# Patient Record
Sex: MALE | Race: WHITE | Employment: FULL TIME | URBAN - METROPOLITAN AREA
[De-identification: names, ages, dates, MRNs, and addresses within clinical notes are randomized per-mention and may not be internally consistent; named-entity substitution may affect disease eponyms.]

---

## 2022-07-14 ENCOUNTER — OFFICE VISIT (OUTPATIENT)
Dept: ORTHOPEDIC SURGERY | Age: 62
End: 2022-07-14
Payer: COMMERCIAL

## 2022-07-14 VITALS — BODY MASS INDEX: 25.18 KG/M2 | HEIGHT: 69 IN | WEIGHT: 170 LBS

## 2022-07-14 DIAGNOSIS — M25.561 RIGHT KNEE PAIN, UNSPECIFIED CHRONICITY: Primary | ICD-10-CM

## 2022-07-14 PROCEDURE — 99204 OFFICE O/P NEW MOD 45 MIN: CPT | Performed by: ORTHOPAEDIC SURGERY

## 2022-07-15 ENCOUNTER — TELEPHONE (OUTPATIENT)
Dept: ORTHOPEDIC SURGERY | Age: 62
End: 2022-07-15

## 2022-07-15 NOTE — TELEPHONE ENCOUNTER
General Question     Subject: PATIENT STATES HE IS UNABLE TO GET HIS MRI SCHEDULE. Leia Alberts IS NEEDING PATIENT'S MRI ORDER FAX TO [phone]. PLEASE ADVISE.    Patient and /or Facility Request: Nabor Felder  Contact Number: [phone]

## 2022-07-15 NOTE — TELEPHONE ENCOUNTER
Care Management Discharge Note    Discharge Date: 07/27/2021       Discharge Disposition:  Home     Discharge Services:  No service needs identified    Discharge DME:  none    Discharge Transportation:  Family     Education Provided on the Discharge Plan:  Yes  Persons Notified of Discharge Plans: Yes  Patient/Family in Agreement with the Plan:  Yes    Handoff Referral Completed: Yes    Additional Information:  Patient discharged home with no needs identified.    Torito Garcias RN      Torito Garcias RN, BSN  5B RN Care Coordinator  [phone] phone  [phone] pager    Weekend or Holiday Care Coordinator [phone] pager  Job Code [id number]   Refaxed order. Let patient know this has been faxed over. I asked him let us know if this continues to be an issue. His MRI did not require precert. All questions answered. Patient expressed understanding.

## 2022-07-18 NOTE — PROGRESS NOTES
7/14/2022     Reason for visit:  Right knee pain    History of Present Illness: The patient is a 70-year-old male who presents for evaluation of his right knee. Presents as a referral from Dr. Noah Smith. He reports 3 weeks of pain. This occurred after he was running. He felt immediate pain followed by swelling. Ever since then he has had persistent pain as well as catching and locking of the knee. The pain is mostly medial.    Medical History:  No past medical history on file. Past Surgical History:   Procedure Laterality Date    HAND SURGERY Left 1997    KNEE SURGERY Left 1983    KNEE SURGERY Left 1984    SHOULDER ARTHROSCOPY Left 1977    SHOULDER ARTHROSCOPY Right 2006      No family history on file. Social History     Socioeconomic History    Marital status:      Spouse name: Not on file    Number of children: Not on file    Years of education: Not on file    Highest education level: Not on file   Occupational History    Not on file   Tobacco Use    Smoking status: Never    Smokeless tobacco: Not on file   Substance and Sexual Activity    Alcohol use: Not on file    Drug use: Not on file    Sexual activity: Not on file   Other Topics Concern    Not on file   Social History Narrative    Not on file     Social Determinants of Health     Financial Resource Strain: Not on file   Food Insecurity: Not on file   Transportation Needs: Not on file   Physical Activity: Not on file   Stress: Not on file   Social Connections: Not on file   Intimate Partner Violence: Not on file   Housing Stability: Not on file      Current Outpatient Medications on File Prior to Visit   Medication Sig Dispense Refill    Omega 3-6-9 Fatty Acids (OMEGA 3-6-9 COMPLEX PO) Take 1 tablet by mouth daily. Misc Natural Products (GLUCOSAMINE CHONDROITIN ADV PO) Take 2 tablets by mouth daily. Multiple Vitamins-Minerals (MULTIVITAMIN PO) Take 1 tablet by mouth daily.        No current facility-administered medications on file prior to visit. No Known Allergies     Review of Systems:  Constitutional: Patient is adequately groomed with no evidence of malnutrition  Mental Status: The patient is oriented to time, place and person. The patient's mood and affect are appropriate. Lymphatic: The lymphatic examination bilaterally reveals all areas to be without enlargement or induration. Vascular: Examination reveals no swelling or calf tenderness. Peripheral pulses are palpable and 2+. Neurological: The patient has good coordination. There is no weakness or sensory deficit. Skin:  Head/Neck: inspection reveals no rashes, ulcerations or lesions. Trunk: inspection reveals no rashes, ulcerations or lesions. Objective:  Ht 5' 9\" (1.753 m)   Wt 170 lb (77.1 kg)   BMI 25.10 kg/m²      Physical Exam:  The patient is well-appearing and in no apparent distress  Examination of the right knee   There is a small effusion, no gross deformity or skin changes  Range of motion reveals 0 to 125  Neg lachman, negative posterior drawer, no pain or laxity with varus or valgus stress at 0 degrees and 30 degrees of flexion  + medial joint line tenderness  5 out of 5 strength throughout distal muscle groups  Sensation is intact to light touch throughout all distributions  There is no calf swelling or tenderness  Palpable DP pulse, brisk cap refill, 2+ symmetric reflexes     Imagin view x-rays of the right knee were obtained the office today on 2022 and reviewed. There is no fracture or dislocation. No other abnormality. Assessment:  Right knee pain following injury. Concern for possible medial meniscus tear    Plan:  I discussed the patient the differential diagnosis. Given acuity of his injury combined with his swelling and mechanical symptoms we will proceed with an MRI. He is in agreement. Following that study he will return to review the results in the office and discuss definitive treatment options.     Greater than 45 minutes were spent with this encounter. Time spent included evaluating the patient's chart prior to arrival.  Evaluating the patient in the office including history, physical examination, imaging reviewing, and counseling on next steps. Lastly, time was spent discussing orders with my staff as well as providing documentation in the chart. Kia Frost MD            Orthopaedic Surgery Sports Medicine and [address] and [address] [facility]            Team Physician [facility] ([place]      Disclaimer: This note was dictated with voice recognition software. Though review and correction are routine, we apologize for any errors.

## 2022-07-21 ENCOUNTER — OFFICE VISIT (OUTPATIENT)
Dept: ORTHOPEDIC SURGERY | Age: 62
End: 2022-07-21
Payer: COMMERCIAL

## 2022-07-21 ENCOUNTER — TELEPHONE (OUTPATIENT)
Dept: ORTHOPEDIC SURGERY | Age: 62
End: 2022-07-21

## 2022-07-21 VITALS — HEIGHT: 69 IN | WEIGHT: 170 LBS | BODY MASS INDEX: 25.18 KG/M2

## 2022-07-21 DIAGNOSIS — S83.241A TEAR OF MEDIAL MENISCUS OF RIGHT KNEE, CURRENT, UNSPECIFIED TEAR TYPE, INITIAL ENCOUNTER: Primary | ICD-10-CM

## 2022-07-21 PROCEDURE — 99215 OFFICE O/P EST HI 40 MIN: CPT | Performed by: ORTHOPAEDIC SURGERY

## 2022-07-21 NOTE — TELEPHONE ENCOUNTER
Spoke with Pt about his MRI, Pt agreed to ome in today at 2:15 at [facility].  Address given

## 2022-07-25 NOTE — PROGRESS NOTES
2022     Reason for visit:  Right knee pain    History of Present Illness: The patient is a 71-year-old male who presents for evaluation of his right knee. Presents as a referral from Dr. Laura Jonas. He reports 3 weeks of pain. This occurred after he was running. He felt immediate pain followed by swelling. Ever since then he has had persistent pain as well as catching and locking of the knee. The pain is mostly medial.    At the last visit we elected proceed with an MRI. He is here to review the results and discuss definitive treatment options. Objective:  Ht 5' 9\" (1.753 m)   Wt 170 lb (77.1 kg)   BMI 25.10 kg/m²      Physical Exam:  The patient is well-appearing and in no apparent distress  Examination of the right knee   There is a small effusion, no gross deformity or skin changes  Range of motion reveals 0 to 125  Neg lachman, negative posterior drawer, no pain or laxity with varus or valgus stress at 0 degrees and 30 degrees of flexion  + medial joint line tenderness  5 out of 5 strength throughout distal muscle groups  Sensation is intact to light touch throughout all distributions  There is no calf swelling or tenderness  Palpable DP pulse, brisk cap refill, 2+ symmetric reflexes     Imagin view x-rays of the right knee were obtained the office today on 2022 and reviewed. There is no fracture or dislocation. No other abnormality. MRI of the right knee was reviewed. There is a medial meniscus tear involving the posterior horn. Overall chondral surfaces are intact. Assessment:  Right knee pain following injury. MRI reveals medial meniscus tear    Plan:  I had a very long discussion with the patient. We spent time reviewing the imaging findings. At this point he does have an isolated medial meniscus tear as result of his injury. His knee otherwise is very healthy with intact chondral surfaces. Options at this point include operative as well as nonoperative management. From a nonoperative standpoint we could consider cortisone injection and physical therapy. From a surgical standpoint we could consider right knee arthroscopy with partial medial meniscectomy. We thoroughly discussed the pros and cons associated both options. I did tell the patient that partial meniscectomy may result in increased risks of degeneration of the medial compartment as well as possible pain following surgery as result of meniscal deficiency. However at the same time it does give a high likelihood of improving symptoms from the meniscus tear itself. He does not have extensive degeneration of the knee which is a good prognostic factor associated with arthroscopy. The risks of surgical intervention were defined as but not limited to infection, bleeding, damage to blood vessels or nerves, need for additional surgery. He does understand elects proceed with surgical intervention. Greater than 45 minutes were spent with this encounter. Time spent included evaluating the patient's chart prior to arrival.  Evaluating the patient in the office including history, physical examination, imaging reviewing, and counseling on next steps. Lastly, time was spent discussing orders with my staff as well as providing documentation in the chart. Marco Zaldivar MD            Orthopaedic Surgery Sports Medicine and [address] [place] [address] and 102 [facility]            Team Physician Cyn[facility] ([facility])      Disclaimer: This note was dictated with voice recognition software. Though review and correction are routine, we apologize for any errors.

## 2022-08-05 ENCOUNTER — TELEPHONE (OUTPATIENT)
Dept: ORTHOPEDIC SURGERY | Age: 62
End: 2022-08-05

## 2022-08-23 RX ORDER — TADALAFIL 2.5 MG/1
2.5 TABLET ORAL DAILY
COMMUNITY
Start: 2021-09-12

## 2022-08-23 NOTE — PROGRESS NOTES
Name_______________________________________Printed:____________________  Date and time of surgery____8/26/2022______0730______________Arrival Time:__0600______________   1. The instructions given regarding when and if a patient needs to stop oral intake prior to surgery varies. Follow the specific instructions you were given                  _x__Nothing to eat or to drink after Midnight the night before.                   ____Carbo loading or ERAS instructions will be given to select patients-if you have been given those instructions -please do the following                           The evening before your surgery after dinner before midnight drink 40 ounces of gatorade. If you are diabetic use sugar free. The morning of surgery drink 40 ounces of water. This needs to be finished 3 hours prior to your surgery start time. 2. Take the following pills with a small sip of water on the morning of surgery___________________________________________________                  Do not take blood pressure medications ending in pril or sartan the cassia prior to surgery or the morning of surgery_   3. Aspirin, Ibuprofen, Advil, Naproxen, Vitamin E and other Anti-inflammatory products and supplements should be stopped for 5 -7days before surgery or as directed by your physician. 4. Check with your Doctor regarding stopping Plavix, Coumadin,Eliquis, Lovenox,Effient,Pradaxa,Xarelto, Fragmin or other blood thinners and follow their instructions. 5. Do not smoke, and do not drink any alcoholic beverages 24 hours prior to surgery. This includes NA Beer. Refrain from the usage of any recreational drugs. 6. You may brush your teeth and gargle the morning of surgery. DO NOT SWALLOW WATER   7. You MUST make arrangements for a responsible adult to stay on site while you are here and take you home after your surgery. You will not be allowed to leave alone or drive yourself home.   It is strongly suggested someone stay with you the first 24 hrs. Your surgery will be cancelled if you do not have a ride home. 8. A parent/legal guardian must accompany a child scheduled for surgery and plan to stay at the hospital until the child is discharged. Please do not bring other children with you. 9. Please wear simple, loose fitting clothing to the hospital.  Charlie Myles not bring valuables (money, credit cards, checkbooks, etc.) Do not wear any makeup (including no eye makeup) or nail polish on your fingers or toes. 10. DO NOT wear any jewelry or piercings on day of surgery. All body piercing jewelry must be removed. 11. If you have ___dentures, they will be removed before going to the OR; we will provide you a container. If you wear ___contact lenses or ___glasses, they will be removed; please bring a case for them. 12. Please see your family doctor/pediatrician for a history & physical and/or concerning medications. Bring any test results/reports from your physician's office. PCP__________________Phone___________H&P Appt. Date________             13 If you  have a Living Will and Durable Power of  for Healthcare, please bring in a copy. 15. Notify your Surgeon if you develop any illness between now and surgery  time, cough, cold, fever, sore throat, nausea, vomiting, etc.  Please notify your surgeon if you experience dizziness, shortness of breath or blurred vision between now & the time of your surgery             15. DO NOT shave your operative site 96 hours prior to surgery. For face & neck surgery, men may use an electric razor 48 hours prior to surgery. 16. Shower the night before or morning of surgery using an antibacterial soap or as you have been instructed. 17. To provide excellent care visitors will be limited to one in the room at any given time. 18.  Please bring picture ID and insurance card.              19.  Visit our web site for additional information:  [email or web address]-eprep              20.During flu season no children under the age of 15 are permitted in the hospital for the safety of all patients. 21. If you take a long acting insulin in the evening only  take half of your usual  dose the night  before your procedure              22. If you use a c-pap please bring DOS if staying overnight,             23.For your convenience [facility] has a pharmacy on site to fill your prescriptions. 24. If you use oxygen and have a portable tank please bring it  with you the DOS             25. Bring a complete list of all your medications with name and dose include any supplements. 26. Other__________________________________________   *Please call pre admission testing if you any further questions   [address]    [facility] HEART AND LUNG [facility]  [phone]   [address] [place] [address]       VISITOR POLICY(subject to change)    Current policy is 2 visitors per patient. No children. A mask is required. Visiting hours are 8a-8p. Overnight visitors will be at the discretion of the nurse. All above information reviewed with patient in person or by phone. Patient verbalizes understanding. All questions and concerns addressed.                                                                                                  Patient/Rep___patient_________________                                                                                                                                    PRE OP INSTRUCTIONS

## 2022-08-24 ENCOUNTER — TELEPHONE (OUTPATIENT)
Dept: ORTHOPEDIC SURGERY | Age: 62
End: 2022-08-24

## 2022-08-24 DIAGNOSIS — G89.18 POST-OP PAIN: ICD-10-CM

## 2022-08-24 DIAGNOSIS — S83.241A TEAR OF MEDIAL MENISCUS OF RIGHT KNEE, CURRENT, UNSPECIFIED TEAR TYPE, INITIAL ENCOUNTER: Primary | ICD-10-CM

## 2022-08-25 ENCOUNTER — TELEPHONE (OUTPATIENT)
Dept: ORTHOPEDIC SURGERY | Age: 62
End: 2022-08-25

## 2022-08-25 RX ORDER — TRAMADOL HYDROCHLORIDE 50 MG/1
50 TABLET ORAL EVERY 4 HOURS PRN
Qty: 30 TABLET | Refills: 0 | Status: SHIPPED | OUTPATIENT
Start: 2022-08-25 | End: 2022-09-01

## 2022-08-25 RX ORDER — ASPIRIN 325 MG
325 TABLET, DELAYED RELEASE (ENTERIC COATED) ORAL DAILY
Qty: 14 TABLET | Refills: 0 | Status: SHIPPED | OUTPATIENT
Start: 2022-08-25 | End: 2022-09-08

## 2022-08-25 RX ORDER — ONDANSETRON 4 MG/1
4 TABLET, FILM COATED ORAL EVERY 8 HOURS PRN
Qty: 21 TABLET | Refills: 0 | Status: SHIPPED | OUTPATIENT
Start: 2022-08-25 | End: 2022-09-01

## 2022-08-25 NOTE — DISCHARGE INSTRUCTIONS
Orthopedic Surgery Discharge Instructions    Medications:   A pain medication has been prescribed for you. Please take this as instructed by the pharmacist.  An anti-nausea medication has been prescribed for you to use as needed for nausea. Either aspirin or a blood thinner medication may have been prescribed for you. Please take this as instructed. Activity:  Weightbearing as tolerated with crutches as needed. Incision/dressings: You may remove your dressing in 72 hours. Until then the dressing needs to remain clean and dry. Following removal dressing please apply dry dressing daily. You may shower in 72 hours and allow the water to run over the incision. However no submerging underwater or getting in pools. Follow-up: If you do not already have a postoperative follow-up appointment scheduled you should call to schedule an appointment within 10 to 14 days of surgery. Emergency or after hours questions:  Please call the main call center at [phone] for all questions or concerns during evening and weekend hours. The call center will direct your call to the on-call physician to answer your questions and concerns. During weekly office hours you may call my assistant, Mar, at [phone]. Akhil Hansen MD            Orthopaedic Surgery Sports Medicine and [address] [facility] and 102 [facility] Physician [facility])        [address]    Follow your surgeons instructions. Follow up with your surgeon as directed. Observe the operative area for signs of excessive bleeding. If needed apply pressure,elevate if able and contact your surgeon. Observe the operative site for any signs of infection- such as increased pain,redness,fever greater than 101 degrees,swelling, foul odor or drainage. Contact your surgeon if any of these symptoms are present. Keep operative site clean and dry. Do not remove dressing unless instructed to by surgeon. Apply ice as directed. If unable to urinate once you are at home,  notify your surgeon or go to the Emergency Room. Avoid pulling,pushing or tugging to suture line. If you become short of breath call your doctor or go to the ER. Take medications as directed. Pain medication should be taken with food. Do not drive or operate machinery while taking narcotics. For any problems or question call your surgeon. ANESTHESIA DISCHARGE INSTRUCTIONS    Wear your seatbelt home. You are under the influence of drugs-do not drink alcohol, drive, operate machinery, make any important decisions or sign any legal documents for 24 hours. A responsible adult needs to be with you for 24 hours. You may experience lightheadedness, dizziness, or sleepiness following surgery. Rest at home today- increase activity as tolerated. Progress slowly to a regular diet unless your physician has instructed you otherwise. Drink plenty of water. If persistent nausea and vomiting becomes a problem, call your physician. Coughing, sore throat and muscle aches are other side effects of anesthesia, and should improve with time. Do not drive or operate machinery while taking narcotics.

## 2022-08-26 ENCOUNTER — ANESTHESIA EVENT (OUTPATIENT)
Dept: OPERATING ROOM | Age: 62
End: 2022-08-26
Payer: COMMERCIAL

## 2022-08-26 ENCOUNTER — ANESTHESIA (OUTPATIENT)
Dept: OPERATING ROOM | Age: 62
End: 2022-08-26
Payer: COMMERCIAL

## 2022-08-26 ENCOUNTER — HOSPITAL ENCOUNTER (OUTPATIENT)
Age: 62
Setting detail: OUTPATIENT SURGERY
Discharge: HOME | End: 2022-08-26
Attending: ORTHOPAEDIC SURGERY | Admitting: ORTHOPAEDIC SURGERY
Payer: COMMERCIAL

## 2022-08-26 VITALS
DIASTOLIC BLOOD PRESSURE: 87 MMHG | WEIGHT: 171 LBS | SYSTOLIC BLOOD PRESSURE: 138 MMHG | TEMPERATURE: 97.4 F | BODY MASS INDEX: 25.33 KG/M2 | RESPIRATION RATE: 17 BRPM | HEIGHT: 69 IN | OXYGEN SATURATION: 98 % | HEART RATE: 53 BPM

## 2022-08-26 PROCEDURE — 7100000001 HC PACU RECOVERY - ADDTL 15 MIN: Performed by: ORTHOPAEDIC SURGERY

## 2022-08-26 PROCEDURE — 7100000000 HC PACU RECOVERY - FIRST 15 MIN: Performed by: ORTHOPAEDIC SURGERY

## 2022-08-26 PROCEDURE — 2580000003 HC RX 258: Performed by: ORTHOPAEDIC SURGERY

## 2022-08-26 PROCEDURE — 2709999900 HC NON-CHARGEABLE SUPPLY: Performed by: ORTHOPAEDIC SURGERY

## 2022-08-26 PROCEDURE — 7100000010 HC PHASE II RECOVERY - FIRST 15 MIN: Performed by: ORTHOPAEDIC SURGERY

## 2022-08-26 PROCEDURE — 6370000000 HC RX 637 (ALT 250 FOR IP): Performed by: ANESTHESIOLOGY

## 2022-08-26 PROCEDURE — 3700000000 HC ANESTHESIA ATTENDED CARE: Performed by: ORTHOPAEDIC SURGERY

## 2022-08-26 PROCEDURE — 2500000003 HC RX 250 WO HCPCS: Performed by: ORTHOPAEDIC SURGERY

## 2022-08-26 PROCEDURE — 3600000004 HC SURGERY LEVEL 4 BASE: Performed by: ORTHOPAEDIC SURGERY

## 2022-08-26 PROCEDURE — 3600000014 HC SURGERY LEVEL 4 ADDTL 15MIN: Performed by: ORTHOPAEDIC SURGERY

## 2022-08-26 PROCEDURE — 2500000003 HC RX 250 WO HCPCS: Performed by: NURSE ANESTHETIST, CERTIFIED REGISTERED

## 2022-08-26 PROCEDURE — 6360000002 HC RX W HCPCS: Performed by: NURSE ANESTHETIST, CERTIFIED REGISTERED

## 2022-08-26 PROCEDURE — 6360000002 HC RX W HCPCS: Performed by: ORTHOPAEDIC SURGERY

## 2022-08-26 PROCEDURE — 7100000011 HC PHASE II RECOVERY - ADDTL 15 MIN: Performed by: ORTHOPAEDIC SURGERY

## 2022-08-26 PROCEDURE — 3700000001 HC ADD 15 MINUTES (ANESTHESIA): Performed by: ORTHOPAEDIC SURGERY

## 2022-08-26 RX ORDER — OXYCODONE HYDROCHLORIDE 5 MG/1
5 TABLET ORAL
Status: COMPLETED | OUTPATIENT
Start: 2022-08-26 | End: 2022-08-26

## 2022-08-26 RX ORDER — BUPIVACAINE HYDROCHLORIDE 5 MG/ML
INJECTION, SOLUTION EPIDURAL; INTRACAUDAL
Status: COMPLETED | OUTPATIENT
Start: 2022-08-26 | End: 2022-08-26

## 2022-08-26 RX ORDER — GLYCOPYRROLATE 0.2 MG/ML
INJECTION INTRAMUSCULAR; INTRAVENOUS PRN
Status: DISCONTINUED | OUTPATIENT
Start: 2022-08-26 | End: 2022-08-26 | Stop reason: SDUPTHER

## 2022-08-26 RX ORDER — LIDOCAINE HYDROCHLORIDE 10 MG/ML
1 INJECTION, SOLUTION EPIDURAL; INFILTRATION; INTRACAUDAL; PERINEURAL
Status: DISCONTINUED | OUTPATIENT
Start: 2022-08-26 | End: 2022-08-26 | Stop reason: HOSPADM

## 2022-08-26 RX ORDER — PROPOFOL 10 MG/ML
INJECTION, EMULSION INTRAVENOUS PRN
Status: DISCONTINUED | OUTPATIENT
Start: 2022-08-26 | End: 2022-08-26 | Stop reason: SDUPTHER

## 2022-08-26 RX ORDER — LABETALOL HYDROCHLORIDE 5 MG/ML
5 INJECTION, SOLUTION INTRAVENOUS
Status: DISCONTINUED | OUTPATIENT
Start: 2022-08-26 | End: 2022-08-26 | Stop reason: HOSPADM

## 2022-08-26 RX ORDER — FENTANYL CITRATE 50 UG/ML
INJECTION, SOLUTION INTRAMUSCULAR; INTRAVENOUS PRN
Status: DISCONTINUED | OUTPATIENT
Start: 2022-08-26 | End: 2022-08-26 | Stop reason: SDUPTHER

## 2022-08-26 RX ORDER — DEXAMETHASONE SODIUM PHOSPHATE 4 MG/ML
INJECTION, SOLUTION INTRA-ARTICULAR; INTRALESIONAL; INTRAMUSCULAR; INTRAVENOUS; SOFT TISSUE PRN
Status: DISCONTINUED | OUTPATIENT
Start: 2022-08-26 | End: 2022-08-26 | Stop reason: SDUPTHER

## 2022-08-26 RX ORDER — ONDANSETRON 2 MG/ML
INJECTION INTRAMUSCULAR; INTRAVENOUS PRN
Status: DISCONTINUED | OUTPATIENT
Start: 2022-08-26 | End: 2022-08-26 | Stop reason: SDUPTHER

## 2022-08-26 RX ORDER — SODIUM CHLORIDE 9 MG/ML
INJECTION, SOLUTION INTRAVENOUS CONTINUOUS
Status: DISCONTINUED | OUTPATIENT
Start: 2022-08-26 | End: 2022-08-26 | Stop reason: HOSPADM

## 2022-08-26 RX ORDER — MIDAZOLAM HYDROCHLORIDE 1 MG/ML
INJECTION INTRAMUSCULAR; INTRAVENOUS PRN
Status: DISCONTINUED | OUTPATIENT
Start: 2022-08-26 | End: 2022-08-26 | Stop reason: SDUPTHER

## 2022-08-26 RX ORDER — HYDROMORPHONE HCL 110MG/55ML
PATIENT CONTROLLED ANALGESIA SYRINGE INTRAVENOUS PRN
Status: DISCONTINUED | OUTPATIENT
Start: 2022-08-26 | End: 2022-08-26 | Stop reason: SDUPTHER

## 2022-08-26 RX ORDER — HYDROMORPHONE HCL 110MG/55ML
0.25 PATIENT CONTROLLED ANALGESIA SYRINGE INTRAVENOUS EVERY 5 MIN PRN
Status: DISCONTINUED | OUTPATIENT
Start: 2022-08-26 | End: 2022-08-26 | Stop reason: HOSPADM

## 2022-08-26 RX ORDER — ONDANSETRON 2 MG/ML
4 INJECTION INTRAMUSCULAR; INTRAVENOUS
Status: DISCONTINUED | OUTPATIENT
Start: 2022-08-26 | End: 2022-08-26 | Stop reason: HOSPADM

## 2022-08-26 RX ORDER — SODIUM CHLORIDE, SODIUM LACTATE, POTASSIUM CHLORIDE, CALCIUM CHLORIDE 600; 310; 30; 20 MG/100ML; MG/100ML; MG/100ML; MG/100ML
INJECTION, SOLUTION INTRAVENOUS CONTINUOUS
Status: DISCONTINUED | OUTPATIENT
Start: 2022-08-26 | End: 2022-08-26 | Stop reason: HOSPADM

## 2022-08-26 RX ORDER — KETAMINE HCL IN NACL, ISO-OSM 100MG/10ML
SYRINGE (ML) INJECTION PRN
Status: DISCONTINUED | OUTPATIENT
Start: 2022-08-26 | End: 2022-08-26 | Stop reason: SDUPTHER

## 2022-08-26 RX ORDER — KETOROLAC TROMETHAMINE 30 MG/ML
INJECTION, SOLUTION INTRAMUSCULAR; INTRAVENOUS PRN
Status: DISCONTINUED | OUTPATIENT
Start: 2022-08-26 | End: 2022-08-26 | Stop reason: SDUPTHER

## 2022-08-26 RX ORDER — LIDOCAINE HYDROCHLORIDE 20 MG/ML
INJECTION, SOLUTION EPIDURAL; INFILTRATION; INTRACAUDAL; PERINEURAL PRN
Status: DISCONTINUED | OUTPATIENT
Start: 2022-08-26 | End: 2022-08-26 | Stop reason: SDUPTHER

## 2022-08-26 RX ORDER — HYDROMORPHONE HCL 110MG/55ML
0.5 PATIENT CONTROLLED ANALGESIA SYRINGE INTRAVENOUS EVERY 5 MIN PRN
Status: DISCONTINUED | OUTPATIENT
Start: 2022-08-26 | End: 2022-08-26 | Stop reason: HOSPADM

## 2022-08-26 RX ADMIN — SODIUM CHLORIDE, POTASSIUM CHLORIDE, SODIUM LACTATE AND CALCIUM CHLORIDE: 600; 310; 30; 20 INJECTION, SOLUTION INTRAVENOUS at 09:38

## 2022-08-26 RX ADMIN — GLYCOPYRROLATE 0.2 MG: 0.2 INJECTION INTRAMUSCULAR; INTRAVENOUS at 11:47

## 2022-08-26 RX ADMIN — SODIUM CHLORIDE, POTASSIUM CHLORIDE, SODIUM LACTATE AND CALCIUM CHLORIDE: 600; 310; 30; 20 INJECTION, SOLUTION INTRAVENOUS at 10:02

## 2022-08-26 RX ADMIN — PROPOFOL 180 MG: 10 INJECTION, EMULSION INTRAVENOUS at 11:49

## 2022-08-26 RX ADMIN — FENTANYL CITRATE 100 MCG: 50 INJECTION, SOLUTION INTRAMUSCULAR; INTRAVENOUS at 11:45

## 2022-08-26 RX ADMIN — HYDROMORPHONE HYDROCHLORIDE 0.4 MG: 2 INJECTION, SOLUTION INTRAMUSCULAR; INTRAVENOUS; SUBCUTANEOUS at 12:41

## 2022-08-26 RX ADMIN — OXYCODONE 5 MG: 5 TABLET ORAL at 13:36

## 2022-08-26 RX ADMIN — CEFAZOLIN 2000 MG: 2 INJECTION, POWDER, FOR SOLUTION INTRAMUSCULAR; INTRAVENOUS at 11:41

## 2022-08-26 RX ADMIN — Medication 10 MG: at 12:38

## 2022-08-26 RX ADMIN — ONDANSETRON 4 MG: 2 INJECTION INTRAMUSCULAR; INTRAVENOUS at 11:49

## 2022-08-26 RX ADMIN — HYDROMORPHONE HYDROCHLORIDE 0.4 MG: 2 INJECTION, SOLUTION INTRAMUSCULAR; INTRAVENOUS; SUBCUTANEOUS at 12:37

## 2022-08-26 RX ADMIN — LIDOCAINE HYDROCHLORIDE 80 MG: 20 INJECTION, SOLUTION EPIDURAL; INFILTRATION; INTRACAUDAL; PERINEURAL at 11:49

## 2022-08-26 RX ADMIN — KETOROLAC TROMETHAMINE 30 MG: 60 INJECTION, SOLUTION INTRAMUSCULAR at 12:38

## 2022-08-26 RX ADMIN — MIDAZOLAM 2 MG: 1 INJECTION INTRAMUSCULAR; INTRAVENOUS at 11:44

## 2022-08-26 RX ADMIN — HYDROMORPHONE HYDROCHLORIDE 0.2 MG: 2 INJECTION, SOLUTION INTRAMUSCULAR; INTRAVENOUS; SUBCUTANEOUS at 12:52

## 2022-08-26 RX ADMIN — Medication 20 MG: at 11:49

## 2022-08-26 RX ADMIN — Medication 20 MG: at 11:56

## 2022-08-26 RX ADMIN — DEXAMETHASONE SODIUM PHOSPHATE 10 MG: 4 INJECTION, SOLUTION INTRAMUSCULAR; INTRAVENOUS at 11:51

## 2022-08-26 ASSESSMENT — PAIN - FUNCTIONAL ASSESSMENT: PAIN_FUNCTIONAL_ASSESSMENT: 0-10

## 2022-08-26 ASSESSMENT — PAIN SCALES - GENERAL
PAINLEVEL_OUTOF10: 4
PAINLEVEL_OUTOF10: 4
PAINLEVEL_OUTOF10: 0

## 2022-08-26 ASSESSMENT — PAIN DESCRIPTION - ORIENTATION
ORIENTATION: RIGHT
ORIENTATION: RIGHT

## 2022-08-26 ASSESSMENT — PAIN DESCRIPTION - LOCATION
LOCATION: KNEE
LOCATION: KNEE

## 2022-08-26 ASSESSMENT — PAIN DESCRIPTION - DESCRIPTORS
DESCRIPTORS: DISCOMFORT
DESCRIPTORS: DISCOMFORT

## 2022-08-26 NOTE — ANESTHESIA POSTPROCEDURE EVALUATION
Department of Anesthesiology  Postprocedure Note    Patient: Melia Carrasco  MRN: [id number]  YOB: 1960  Date of evaluation: 8/26/2022      Procedure Summary     Date: 08/26/22 Room / Location: [address]    Anesthesia Start: 6550 Anesthesia Stop: 6908    Procedure: RIGHT KNEE ARTHROSCOPY, PARTIAL MEDIAL MENISECTOMY (Right: Knee) Diagnosis:       Tear of medial meniscus of right knee, unspecified tear type, unspecified whether old or current tear, initial encounter      (N79.062O RIGHT MEDIAL MENSICUS TEAR)    Surgeons: Lian Powell MD Responsible Provider: Henry Parry MD    Anesthesia Type: general ASA Status: 1          Anesthesia Type: No value filed.     Tadeo Phase I: Tadeo Score: 10    Tadeo Phase II: Tadeo Score: 10      Anesthesia Post Evaluation    Patient location during evaluation: PACU  Patient participation: complete - patient participated  Level of consciousness: awake  Airway patency: patent  Nausea & Vomiting: no vomiting  Complications: no  Cardiovascular status: hemodynamically stable  Respiratory status: acceptable  Hydration status: euvolemic  Multimodal analgesia pain management approach

## 2022-08-26 NOTE — PROGRESS NOTES
Pt arrived from OR to PACU bay 6. Reported received from [place] staff. Pt unresponsive s/p anesthesia-breathing spontaneously. Surgical incisions dressings in place to right knee. Pt on 6L simple mask. NSR, and VSS. Will continue to monitor.

## 2022-08-26 NOTE — PROGRESS NOTES
CLINICAL PHARMACY NOTE: MEDS TO BEDS    Total # of Prescriptions Filled: 3   The following medications were delivered to the patient:  Tramadol  Asa 325  Ondansetron 4    Additional Documentation:  Wife signed for scripts

## 2022-08-26 NOTE — ANESTHESIA PRE PROCEDURE
Department of Anesthesiology  Preprocedure Note       Name:  Mirlande Kay   Age:  64 y.o.  :  1960                                          MRN:  [id number]         Date:  2022      Surgeon: Merly Howe):  Akhil Hansen MD    Procedure: Procedure(s):  RIGHT KNEE ARTHROSCOPY, PARTIAL MEDIAL MENISECTOMY    Medications prior to admission:   Prior to Admission medications    Medication Sig Start Date End Date Taking? Authorizing Provider   Tadalafil 2.5 MG TABS Take 2.5 mg by mouth daily 21  Yes Historical Provider, MD   ondansetron (ZOFRAN) 4 MG tablet Take 1 tablet by mouth every 8 hours as needed for Nausea or Vomiting 22  Akhil Hansen MD   aspirin 325 MG EC tablet Take 1 tablet by mouth daily for 14 days 22  Akhil Hansen MD   traMADol (ULTRAM) 50 MG tablet Take 1 tablet by mouth every 4 hours as needed for Pain for up to 30 doses. Intended supply: 7 days. Take lowest dose possible to manage pain 22  Akhil Hansen MD   Omega 3-6-9 Fatty Acids (OMEGA 3-6-9 COMPLEX PO) Take 1 tablet by mouth daily. Historical Provider, MD   Misc Natural Products (GLUCOSAMINE CHONDROITIN ADV PO) Take 2 tablets by mouth daily. Historical Provider, MD   Multiple Vitamins-Minerals (MULTIVITAMIN PO) Take 1 tablet by mouth daily.     Historical Provider, MD       Current medications:    Current Facility-Administered Medications   Medication Dose Route Frequency Provider Last Rate Last Admin    ceFAZolin (ANCEF) 2,000 mg in dextrose 5 % 50 mL IVPB (mini-bag)  2,000 mg IntraVENous On Call to Lexx Hargrove MD        lactated ringers infusion   IntraVENous Continuous Akhil Hansen MD 50 mL/hr at 22 1002 New Bag at 22 1002    lidocaine PF 1 % injection 1 mL  1 mL IntraDERmal Once PRN Deb Hinton MD        0.9 % sodium chloride infusion   IntraVENous Continuous Deb Hinton MD           Allergies:  No Known Allergies    Problem List:  There is no problem list on file for this patient. Past Medical History:  History reviewed. No pertinent past medical history. Past Surgical History:        Procedure Laterality Date    HAND SURGERY Left 1997    KNEE SURGERY Left 1983    KNEE SURGERY Left 1984    SHOULDER ARTHROSCOPY Left 1977    SHOULDER ARTHROSCOPY Right 2006       Social History:    Social History     Tobacco Use    Smoking status: Never    Smokeless tobacco: Never   Substance Use Topics    Alcohol use: Not on file                                Counseling given: Not Answered      Vital Signs (Current):   Vitals:    08/23/22 0940 08/26/22 0954   BP:  119/79   Pulse:  58   Resp:  16   Temp:  97.1 °F (36.2 °C)   TempSrc:  Temporal   SpO2:  97%   Weight: 172 lb (78 kg) 171 lb (77.6 kg)   Height: 5' 8.5\" (1.74 m) 5' 8.5\" (1.74 m)                                              BP Readings from Last 3 Encounters:   08/26/22 119/79   03/26/14 121/76   02/25/14 115/75       NPO Status: Time of last liquid consumption: 2130                        Time of last solid consumption: 2100                        Date of last liquid consumption: 08/25/22                        Date of last solid food consumption: 08/25/22    BMI:   Wt Readings from Last 3 Encounters:   08/26/22 171 lb (77.6 kg)   07/21/22 170 lb (77.1 kg)   07/14/22 170 lb (77.1 kg)     Body mass index is 25.62 kg/m². CBC: No results found for: WBC, RBC, HGB, HCT, MCV, RDW, PLT    CMP: No results found for: NA, K, CL, CO2, BUN, CREATININE, GFRAA, AGRATIO, LABGLOM, GLUCOSE, GLU, PROT, CALCIUM, BILITOT, ALKPHOS, AST, ALT    POC Tests: No results for input(s): POCGLU, POCNA, POCK, POCCL, POCBUN, POCHEMO, POCHCT in the last 72 hours. Coags: No results found for: PROTIME, INR, APTT    HCG (If Applicable): No results found for: PREGTESTUR, PREGSERUM, HCG, HCGQUANT     ABGs: No results found for: PHART, PO2ART, PYA6EXZ, LDI3NYQ, BEART, V4NWHMYJ     Type & Screen (If Applicable):   No results found for: Peter Miller    Drug/Infectious Status (If Applicable):  No results found for: HIV, HEPCAB    COVID-19 Screening (If Applicable): No results found for: COVID19        Anesthesia Evaluation  Patient summary reviewed and Nursing notes reviewed no history of anesthetic complications:   Airway: Mallampati: II  TM distance: >3 FB     Mouth opening: > = 3 FB   Dental: normal exam         Pulmonary:Negative Pulmonary ROS breath sounds clear to auscultation                             Cardiovascular:Negative CV ROS  Exercise tolerance: good (>4 METS),       (-) CABG/stent, dysrhythmias and  angina      Rhythm: regular  Rate: normal                    Neuro/Psych:   Negative Neuro/Psych ROS     (-) seizures, TIA and CVA           GI/Hepatic/Renal:        (-) GERD      ROS comment: Denies gerd sx or n/v today. Endo/Other:        (-) hypothyroidism, hyperthyroidism               Abdominal:             Vascular: negative vascular ROS. - DVT and PE. Other Findings:           Anesthesia Plan      general     ASA 1       Induction: intravenous. MIPS: Postoperative opioids intended and Prophylactic antiemetics administered. Anesthetic plan and risks discussed with patient. Plan discussed with CRNA.                     Buffy Holland MD   8/26/2022

## 2022-08-26 NOTE — PROGRESS NOTES
Discharge instructions review with patient and wife. All home medications have been reviewed, pt v/u. Discharge instructions signed. Pt discharged via wheelchair. Pt discharged with prescriptions for ASA/Zofran/Tramadol, crutches, ice pack, discharge paperwork and all belongings. Wife taking stable pt home.

## 2022-08-28 NOTE — OP NOTE
Operative Note      Patient: Marina Doss  YOB: 1960  MRN: [id number]    Date of Procedure: 8/26/2022    Pre-Op Diagnosis: S83.241A RIGHT MEDIAL MENSICUS TEAR    Post-Op Diagnosis: Same       Procedure(s):  RIGHT KNEE ARTHROSCOPY, PARTIAL MEDIAL MENISECTOMY    Surgeon(s):  Adam Kurtz MD    Assistant:   * No surgical staff found *    Anesthesia: General    Estimated Blood Loss (mL): Minimal    Complications: None    Specimens:   * No specimens in log *    Implants:  * No implants in log *      Drains: * No LDAs found *    Findings: per dictation    Detailed Description of Procedure:   I had a very long discussion with the patient. We spent time reviewing the imaging findings. At this point he does have an isolated medial meniscus tear as result of his injury. His knee otherwise is very healthy with intact chondral surfaces. Options at this point include operative as well as nonoperative management. From a nonoperative standpoint we could consider cortisone injection and physical therapy. From a surgical standpoint we could consider right knee arthroscopy with partial medial meniscectomy. We thoroughly discussed the pros and cons associated both options. I did tell the patient that partial meniscectomy may result in increased risks of degeneration of the medial compartment as well as possible pain following surgery as result of meniscal deficiency. However at the same time it does give a high likelihood of improving symptoms from the meniscus tear itself. He does not have extensive degeneration of the knee which is a good prognostic factor associated with arthroscopy. The risks of surgical intervention were defined as but not limited to infection, bleeding, damage to blood vessels or nerves, need for additional surgery. He does understand elects proceed with surgical intervention. The patient was met in the preoperative holding area. Surgical site was identified marked. Informed consent was obtained. Patient was taken back to the operative room placed on table supine position. General anesthesia was performed. Examination under anesthesia demonstrated a full passive range of motion with no evidence of ligamentous instability. Thigh tourniquet was placed. Lower extremity was prepped and draped using sterile technique. Preop of antibiotics were given within 30 minutes of skin incision. Formal timeout was performed in which the correct patient, surgical site, and procedure was reaffirmed. The [place] was insufflated to 250 mmHg. Anterior inferior lateral incision was made. Arthroscope was introduced into the notch and the patellofemoral joint. Diagnostic arthroscopy was performed. the patellofemoral joint exhibited no evidence of chondral abnormality. Medial lateral gutters were examined and there were no loose bodies present. Trochlear cartilage was intact. Medial compartment was entered. Overall cartilage surface was intact within the medial femoral condyle. An 18-gauge spinal needle was used to gently trephinate the MCL in order to increase her working distance within the medial compartment. The meniscus was visualized. There was complex tearing involving the posterior horn. This involved essentially a radial type tear 2 cm from the root insertion. It did proceed from the inner portions of the meniscus to the peripheral one third. A peripheral one third was preserved. Arthroscopic biter was introduced to resect the degenerative tearing components followed by arthroscopic shaver. This resulted in resection of the inner two thirds with a small rim of meniscus tissue left adjacent to the capsule. We did probe this area and determined it was stable. The notch was evaluated. ACL and PCL were intact. Lateral compartment is entered. The lateral compartment exhibited no evidence of meniscus or chondral abnormality.       The knee was thoroughly irrigated. All instruments removed. Local anesthesia was injected the portal sites. Closure was performed with a 4-0 Monocryl in buried fashion. Sterile dressings were provided in form of Xeroform, 4 x 4's, ABD, Ace. Patient successfully extubated taken covering excellent condition. At the end of procedure all counts correct. I was present for the entire case.     Electronically signed by Joshua Asencio MD on 8/28/2022 at 4:18 PM

## 2022-08-29 ENCOUNTER — TELEPHONE (OUTPATIENT)
Dept: ORTHOPEDIC SURGERY | Age: 62
End: 2022-08-29

## 2022-08-29 ENCOUNTER — OFFICE VISIT (OUTPATIENT)
Dept: ORTHOPEDIC SURGERY | Age: 62
End: 2022-08-29

## 2022-08-29 VITALS — HEIGHT: 69 IN | BODY MASS INDEX: 25.33 KG/M2 | WEIGHT: 171 LBS

## 2022-08-29 DIAGNOSIS — S83.241A TEAR OF MEDIAL MENISCUS OF RIGHT KNEE, CURRENT, UNSPECIFIED TEAR TYPE, INITIAL ENCOUNTER: Primary | ICD-10-CM

## 2022-08-29 PROCEDURE — 99024 POSTOP FOLLOW-UP VISIT: CPT | Performed by: ORTHOPAEDIC SURGERY

## 2022-08-29 NOTE — TELEPHONE ENCOUNTER
SPOKE WITH PT. HE WOULD LIKE TO F/U IN OFFICE SOONER THAN 9/6.  [address] HIS PO APPT TO TODAY AT 1 PM SO HE COULD HAVE A DISCUSSION WITH DR. Walter Alcantar.

## 2022-09-06 ENCOUNTER — EVALUATION (OUTPATIENT)
Dept: PHYSICAL THERAPY | Age: 62
End: 2022-09-06
Payer: COMMERCIAL

## 2022-09-06 ENCOUNTER — OFFICE VISIT (OUTPATIENT)
Dept: ORTHOPEDIC SURGERY | Age: 62
End: 2022-09-06

## 2022-09-06 VITALS — WEIGHT: 171 LBS | HEIGHT: 69 IN | BODY MASS INDEX: 25.33 KG/M2

## 2022-09-06 DIAGNOSIS — S83.241S TEAR OF MEDIAL MENISCUS OF RIGHT KNEE, CURRENT, UNSPECIFIED TEAR TYPE, SEQUELA: ICD-10-CM

## 2022-09-06 DIAGNOSIS — G89.18 ACUTE POSTOPERATIVE PAIN OF RIGHT KNEE: Primary | ICD-10-CM

## 2022-09-06 DIAGNOSIS — M25.561 ACUTE POSTOPERATIVE PAIN OF RIGHT KNEE: Primary | ICD-10-CM

## 2022-09-06 DIAGNOSIS — S83.241A TEAR OF MEDIAL MENISCUS OF RIGHT KNEE, CURRENT, UNSPECIFIED TEAR TYPE, INITIAL ENCOUNTER: Primary | ICD-10-CM

## 2022-09-06 PROCEDURE — 97530 THERAPEUTIC ACTIVITIES: CPT | Performed by: PHYSICAL THERAPIST

## 2022-09-06 PROCEDURE — 97161 PT EVAL LOW COMPLEX 20 MIN: CPT | Performed by: PHYSICAL THERAPIST

## 2022-09-06 PROCEDURE — 99024 POSTOP FOLLOW-UP VISIT: CPT | Performed by: ORTHOPAEDIC SURGERY

## 2022-09-06 PROCEDURE — 97016 VASOPNEUMATIC DEVICE THERAPY: CPT | Performed by: PHYSICAL THERAPIST

## 2022-09-06 PROCEDURE — 97110 THERAPEUTIC EXERCISES: CPT | Performed by: PHYSICAL THERAPIST

## 2022-09-06 NOTE — PROGRESS NOTES
morning. Discomfort progressess as the day goes on. He is taking Aleve as needed. He has returned to walking. Relevant Medical History:Left knee surgery x6 in the past, no other significant medical history  Functional Scale/Score:FOTO Score 68    Pain Scale: 4/10  Easing factors: rest, ice  Provocative factors: sitting too long or too much activity     Type: [x]Constant   []Intermittent  []Radiating []Localized []other:     Numbness/Tingling: none    Occupation/School: Works in [employer] remotely    Living Status/Prior Level of Function: Independent with ADLs and IADLs, running, golfing    OBJECTIVE:       ROM PROM AROM Overpressure Comment    L R L R L R 9/6/2022   Flexion   135 130      Extension   +8 +7        Girth  9/6/2022 L R   Mid Patella 40.5 40.5   Suprapatellar     5cm above     15cm above         Strength L R Comment: majority deferred secondary to surgery   Quad      Hamstring      Abduction      Quad tone [facility] + 9/6/2022       Special Test  9/6/2022 Results/Comment: majority deferred secondary to surgery   Homans (-)       Reflexes/Sensation:    [x]Dermatomes/Myotomes intact    []Reflexes equal and normal bilaterally   []Other:    Joint mobility: NA due to surgery   []Normal    []Hypo   []Hyper    Palpation: NA due to surgery    Functional Mobility/Transfers: Independent with all    Posture: WNL    Bandages/Dressings/Incisions: no bandages noted, incisions are closed and healing    Gait: (include devices/WB status) ambulating without AD, fairly normal gait    Orthopedic Special Tests: NA due to surgery                       [x] Patient history, allergies, meds reviewed. Medical chart reviewed. See intake form. Review Of Systems (ROS):  [x]Performed Review of systems (Integumentary, CardioPulmonary, Neurological) by intake and observation. Intake form has been scanned into medical record.  Patient has been instructed to contact their primary care physician regarding ROS issues if not already being addressed at this time. Co-morbidities/Complexities (which will affect course of rehabilitation):   [x]None           Arthritic conditions   []Rheumatoid arthritis (M05.9)  []Osteoarthritis (M19.91)   Cardiovascular conditions   []Hypertension (I10)  []Hyperlipidemia (E78.5)  []Angina pectoris (I20)  []Atherosclerosis (I70)  []CVA Musculoskeletal conditions   []Disc pathology   []Congenital spine pathologies   []Prior surgical intervention  []Osteoporosis (M81.8)  []Osteopenia (M85.8)   Endocrine conditions   []Hypothyroid (E03.9)  []Hyperthyroid Gastrointestinal conditions   []Constipation (Z02.02)   Metabolic conditions   []Morbid obesity (E66.01)  []Diabetes type 1(E10.65) or 2 (E11.65)   []Neuropathy (G60.9)     Pulmonary conditions   []Asthma (J45)  []Coughing   []COPD (J44.9)   Psychological Disorders  []Anxiety (F41.9)  []Depression (F32.9)   []Other:   []Other:          Barriers to/and or personal factors that will affect rehab potential:              []Age  []Sex    []Smoker              [x]Motivation/Lack of Motivation                        []Co-Morbidities              []Cognitive Function, education/learning barriers              []Environmental, home barriers              []profession/work barriers  [x]past PT/medical experience  []other:  Justification:     Falls Risk Assessment (30 days):   [x] Falls Risk assessed and no intervention required.   [] Falls Risk assessed and Patient requires intervention due to being higher risk   TUG score (>12s at risk):     [] Falls education provided, including         ASSESSMENT:   Functional Impairments:     []Noted lumbar/proximal hip/LE hypomobility   [x]Decreased LE functional ROM   [x]Decreased core/proximal hip strength and neuromuscular control   [x]Decreased LE functional strength   []Reduced balance/proprioceptive control   []other:      Functional Activity Limitations (from functional questionnaire and intake)   []Reduced ability to tolerate prolonged functional positions   []Reduced ability or difficulty with changes of positions or transfers between positions   []Reduced ability to maintain good posture and demonstrate good body mechanics with sitting, bending, and lifting   []Reduced ability to sleep   [] Reduced ability or tolerance with driving and/or computer work   []Reduced ability to perform lifting, carrying tasks   [x]Reduced ability to squat   []Reduced ability to forward bend   [x]Reduced ability to ambulate prolonged functional periods/distances/surfaces   []Reduced ability to ascend/descend stairs   [x]Reduced ability to run, hop or jump   []other:     Participation Restrictions   []Reduced participation in self care activities   []Reduced participation in home management activities   []Reduced participation in work activities   [x]Reduced participation in social activities. [x]Reduced participation in sport activities. Classification :    [x]Signs/symptoms consistent with post-surgical status including decreased ROM, strength and function.    []Signs/symptoms consistent with joint sprain/strain   []Signs/symptoms consistent with patella-femoral syndrome   []Signs/symptoms consistent with knee OA/hip OA   []Signs/symptoms consistent with internal derangement of knee/Hip   []Signs/symptoms consistent with functional hip weakness/NMR control      []Signs/symptoms consistent with tendinitis/tendinosis    []signs/symptoms consistent with pathology which may benefit from Dry needling      []other:      Prognosis/Rehab Potential:      []Excellent   [x]Good    []Fair   []Poor    Tolerance of evaluation/treatment:    []Excellent   [x]Good    []Fair   []Poor    Physical Therapy Evaluation Complexity Justification  [x] A history of present problem with:  [x] no personal factors and/or comorbidities that impact the plan of care;  []1-2 personal factors and/or comorbidities that impact the plan of care  []3 personal factors and/or comorbidities that impact the plan of care  [x] An examination of body systems using standardized tests and measures addressing any of the following: body structures and functions (impairments), activity limitations, and/or participation restrictions;:  [x] a total of 1-2 or more elements   [] a total of 3 or more elements   [] a total of 4 or more elements   [x] A clinical presentation with:  [x] stable and/or uncomplicated characteristics   [] evolving clinical presentation with changing characteristics  [] unstable and unpredictable characteristics;   [x] Clinical decision making of [x] low, [] moderate, [] high complexity using standardized patient assessment instrument and/or measurable assessment of functional outcome. [x] EVAL (LOW) 32524 (typically 20 minutes face-to-face)  [] EVAL (MOD) 75325 (typically 30 minutes face-to-face)  [] EVAL (HIGH) 74333 (typically 45 minutes face-to-face)  [] RE-EVAL     PLAN:  Frequency/Duration:  1-2 days per week for 4 Weeks:  Interventions:  [x]  Therapeutic exercise including: strength training, ROM, for Lower extremity and core   [x]  NMR activation and proprioception for LE, Glutes and Core   [x]  Manual therapy as indicated for LE, Hip and spine to include: Dry Needling/IASTM, STM, PROM, Gr I-IV mobilizations, manipulation. [x] Modalities as needed that may include: thermal agents, E-stim, Biofeedback, US, iontophoresis as indicated  [x] Patient education on joint protection, postural re-education, activity modification, progression of HEP. HEP instruction: Patient returned proper demonstration of HEP. Issued patient written program clearly stating sets/reps/sessions per day. Written program was scanned into media section of medical record. (see scanned forms)    GOALS:  Patient stated goal: Goal to run [facility] Oct 2023  [] Progressing: [] Met: [] Not Met: [] Adjusted    Therapist goals for Patient:   Short Term Goals: To be achieved in: 2 weeks  1. Independent in HEP and progression per patient tolerance, in order to prevent re-injury. [] Progressing: [] Met: [] Not Met: [] Adjusted  2. Patient will have a decrease in pain to facilitate improvement in movement, function, and ADLs as indicated by Functional Deficits. [] Progressing: [] Met: [] Not Met: [] Adjusted    Long Term Goals: To be achieved in: 4 weeks  1. Functional index score of 70 or more for FOTO to assist with reaching prior level of function. [] Progressing: [] Met: [] Not Met: [] Adjusted  2. Patient will demonstrate increased AROM to 0 to >135 to allow for proper joint functioning as indicated by patients Functional Deficits. [] Progressing: [] Met: [] Not Met: [] Adjusted  3. Patient will demonstrate an increase in Strength to good proximal hip strength and control, within 5lb HHD in LE to allow for proper functional mobility as indicated by patients Functional Deficits. [] Progressing: [] Met: [] Not Met: [] Adjusted  4. Patient will return to all prior functional activities without increased symptoms or restriction. [] Progressing: [] Met: [] Not Met: [] Adjusted  5.  Prepare to return to running  [] Progressing: [] Met: [] Not Met: [] Adjusted     Electronically signed by:      Gómez Canales      Board Certified Orthopaedic Clinical Specialist  [facility] Certified  Physical Therapist    [place] PT #[id number]  [place] PT #[id number]

## 2022-09-06 NOTE — PROGRESS NOTES
[facility]   Phone: [phone]    Fax: [phone]      Physical Therapy Treatment Note/ Progress Report:           Date:  2022    Patient Name:  Maxine Otto    :  1960  MRN: [id number]  Restrictions/Precautions:    Medical/Treatment Diagnosis Information:  Diagnosis: s/p Right PMM DOS      Insurance/Certification information:  PT Insurance Information: [employer]  Physician Information:  Referring Practitioner: Dr Rosendo Craft  Has the plan of care been signed (Y/N):        []  Yes  [x]  No     Date of Patient follow up with Physician: today      Is this a Progress Report:     []  Yes  [x]  No        If Yes:  Date Range for reporting period:  Beginning 2022  Ending 10/6/2022    Progress report will be due (10 Rx or 30 days whichever is less):        Recertification will be due (POC Duration  / 90 days whichever is less): 10/6/2022         Visit # Insurance Allowable Auth Required   1 60 []  Yes []  No        Functional Scale: FOTO Score 68    Date assessed:  2022      Latex Allergy:  [x]NO      []YES  Preferred Language for Healthcare:   [x]English       []other:      Pain level:  4/10     SUBJECTIVE:  1+ weeks post op    See eval    OBJECTIVE:         ROM PROM AROM Overpressure Comment    L R L R L R 2022   Flexion   135 130      Extension   +8 +7        Girth  2022 L R   Mid Patella 40.5 40.5   Suprapatellar     5cm above     15cm above         Strength L R Comment: majority deferred secondary to surgery   Quad      Hamstring      Abduction      Quad tone [facility] + 2022       Special Test  2022 Results/Comment: majority deferred secondary to surgery   Homans (-)             RESTRICTIONS/PRECAUTIONS: none noted    Exercises/Interventions:     Therapeutic Ex (99523) Sets/sec Reps Notes/CUES   BIKE      TREADMILL            STRETCHING      Towel Pull Calf      Inclined Calf      Hamstrings 30\" x5 Seated EOB   Quads Hip Flexors      ITB      Adductors            ROM      Passive      Active      Weight Hangs      Sheet Pulls 10\" x10    Ankle Pumps      ERMI            STRENGTHENING      Quad Sets 5\" x15 Over towel roll   Ham Sets      Hip ADD Sets BS 5\" x15    SLR Flexion 3 x15 With 2# on ankle   SLR Abduction 3 x15 With 2# on ankle   SLR Prone      SLR Adduction      SLR+            PRE Machines      Knee Extension      Knee Flexion      Leg Press            CKC      Calf Raises 3 x15    Mini Squats      Wall Sits 30\" x3    Step ups      TKE                  Manual Intervention (49129)      Patellar Mobs      Femoral Tibial mobs      STM      Scar Massage      Foam Roll            NMR re-education (46562)   CUES NEEDED   Biodex Balance      Tandem Balance      SLB      Rebounder      BOSU                              Therapeutic Activity (55223)      Core Training      [employer] Activities      Monster Walks      TRX training      education x10'  Educated on anatomy and pathology and natural progression of OA. Also reviewed holding off on running for now. Issued him [address] program for [employer] Code: [email or web address]  URL: [email or web address]  Date: 09/06/2022  Prepared by: Jasiel Going          Therapeutic Exercise and NMR EXR  [x] (70559) Provided verbal/tactile cueing for activities related to strengthening, flexibility, endurance, ROM for improvements in LE, proximal hip, and core control with self care, mobility, lifting, ambulation. [x] (54609) Provided verbal/tactile cueing for activities related to improving balance, coordination, kinesthetic sense, posture, motor skill, proprioception to assist with LE, proximal hip, and core control in self-care, mobility, lifting, ambulation and eccentric single leg control.      NMR and Therapeutic Activities:    [x] (78569 or 21293) Provided verbal/tactile cueing for activities related to improving balance, coordination, kinesthetic sense, posture, eval      GOALS:  Patient stated goal: Goal to run [facility] Oct 2023  [] Progressing: [] Met: [] Not Met: [] Adjusted    Therapist goals for Patient:   Short Term Goals: To be achieved in: 2 weeks  1. Independent in HEP and progression per patient tolerance, in order to prevent re-injury. [] Progressing: [] Met: [] Not Met: [] Adjusted  2. Patient will have a decrease in pain to facilitate improvement in movement, function, and ADLs as indicated by Functional Deficits. [] Progressing: [] Met: [] Not Met: [] Adjusted    Long Term Goals: To be achieved in: 4 weeks  1. Functional index score of 70 or more for FOTO to assist with reaching prior level of function. [] Progressing: [] Met: [] Not Met: [] Adjusted  2. Patient will demonstrate increased AROM to 0 to >135 to allow for proper joint functioning as indicated by patients Functional Deficits. [] Progressing: [] Met: [] Not Met: [] Adjusted  3. Patient will demonstrate an increase in Strength to good proximal hip strength and control, within 5lb HHD in LE to allow for proper functional mobility as indicated by patients Functional Deficits. [] Progressing: [] Met: [] Not Met: [] Adjusted  4. Patient will return to all prior functional activities without increased symptoms or restriction. [] Progressing: [] Met: [] Not Met: [] Adjusted  5. Prepare to return to running  [] Progressing: [] Met: [] Not Met: [] Adjusted       Overall Progression Towards Functional goals/ Treatment Progress Update:  [] Patient is progressing as expected towards functional goals listed. [] Progression is slowed due to complexities/Impairments listed. [] Progression has been slowed due to co-morbidities.   [x] Plan just implemented, too soon to assess goals progression <30days   [] Goals require adjustment due to lack of progress  [] Patient is not progressing as expected and requires additional follow up with physician  [] Other    Prognosis for POC: [x] Good [] Fair  [] Poor      Patient requires continued skilled intervention: [x] Yes  [] No    Treatment/Activity Tolerance:  [x] Patient able to complete treatment  [] Patient limited by fatigue  [] Patient limited by pain    [] Patient limited by other medical complications  [] Other:         PLAN: See eval.   Patient is probably going to continue on his own. Has access to gym equipment, he is very motivated and comfortable continuing on his own.     [] Continue per plan of care [] Alter current plan   [x] Plan of care initiated [] Hold pending MD visit [] Discharge      Electronically signed by:      Josephine Chandler PT      Board Certified Orthopaedic Clinical Specialist  [facility] Certified  Physical Therapist    [place] PT #[id number]  [place] PT #[id number]      Note: If patient does not return for scheduled/ recommended follow up visits, this note will serve as a discharge from care along with most recent update on progress.

## 2022-09-08 NOTE — PROGRESS NOTES
9/6/2022     Reason for visit:  Status post right knee arthroscopy with partial medial meniscectomy on 8/26/2022    History of Present Illness:  Patient presents for postop evaluation. Overall he is doing well. No numbness or tingling. No fever or chills. He has not needed to take the pain medication. Objective:  Ht 5' 8.5\" (1.74 m)   Wt 171 lb (77.6 kg)   BMI 25.62 kg/m²      Physical Exam:  The patient is well-appearing and in no apparent distress  Examination of the right knee   There is a small effusion, no gross deformity or skin changes  Range of motion reveals 0 to 125  Neg lachman, negative posterior drawer, no pain or laxity with varus or valgus stress at 0 degrees and 30 degrees of flexion  + medial joint line tenderness  5 out of 5 strength throughout distal muscle groups  Sensation is intact to light touch throughout all distributions  There is no calf swelling or tenderness  Palpable DP pulse, brisk cap refill, 2+ symmetric reflexes     Assessment:  Status post right knee arthroscopy with partial medial meniscectomy on 8/26/2022    Plan:  Patient is doing well. We will start physical therapy. Return to see me as neck scheduled visit in 4 weeks. Akhil Hansen MD            Orthopaedic Surgery Sports Medicine and [address] and [address] [facility]            Team Physician [facility]      Disclaimer: This note was dictated with voice recognition software. Though review and correction are routine, we apologize for any errors.

## 2022-10-03 ENCOUNTER — OFFICE VISIT (OUTPATIENT)
Dept: ORTHOPEDIC SURGERY | Age: 62
End: 2022-10-03

## 2022-10-03 VITALS — WEIGHT: 171 LBS | HEIGHT: 69 IN | BODY MASS INDEX: 25.33 KG/M2

## 2022-10-03 DIAGNOSIS — S83.241A TEAR OF MEDIAL MENISCUS OF RIGHT KNEE, CURRENT, UNSPECIFIED TEAR TYPE, INITIAL ENCOUNTER: Primary | ICD-10-CM

## 2022-10-03 PROCEDURE — 99024 POSTOP FOLLOW-UP VISIT: CPT | Performed by: ORTHOPAEDIC SURGERY

## 2022-10-13 LAB — TSH ULTRASENSITIVE: 0.66 UIU/ML (ref 0.35–4.94)

## 2022-11-14 ENCOUNTER — OFFICE VISIT (OUTPATIENT)
Dept: ORTHOPEDIC SURGERY | Age: 62
End: 2022-11-14

## 2022-11-14 VITALS — HEIGHT: 69 IN | BODY MASS INDEX: 25.33 KG/M2 | WEIGHT: 171 LBS

## 2022-11-14 DIAGNOSIS — S83.241A TEAR OF MEDIAL MENISCUS OF RIGHT KNEE, CURRENT, UNSPECIFIED TEAR TYPE, INITIAL ENCOUNTER: Primary | ICD-10-CM

## 2022-11-14 PROCEDURE — 99024 POSTOP FOLLOW-UP VISIT: CPT | Performed by: ORTHOPAEDIC SURGERY

## 2022-11-21 NOTE — PROGRESS NOTES
11/14/2022     Reason for visit:  Status post right knee arthroscopy with partial medial meniscectomy on 8/26/2022    History of Present Illness:  Patient presents for postop evaluation. Overall he is doing well. No numbness or tingling. No fever or chills. Objective:  Ht 5' 8.5\" (1.74 m)   Wt 171 lb (77.6 kg)   BMI 25.62 kg/m²      Physical Exam:  The patient is well-appearing and in no apparent distress  Examination of the right knee   There is a small effusion, no gross deformity or skin changes  Range of motion reveals 0 to 125  Neg lachman, negative posterior drawer, no pain or laxity with varus or valgus stress at 0 degrees and 30 degrees of flexion  + medial joint line tenderness  5 out of 5 strength throughout distal muscle groups  Sensation is intact to light touch throughout all distributions  There is no calf swelling or tenderness  Palpable DP pulse, brisk cap refill, 2+ symmetric reflexes     Assessment:  Status post right knee arthroscopy with partial medial meniscectomy on 8/26/2022    Plan:  Overall the patient is doing well. He will return to see me in 6 weeks for repeat evaluation and possible final visit. Edgar Lai MD            Orthopaedic Surgery Sports Medicine and [address] [facility] and [address] [facility]            Team Physician Cynthia ([facility])      Disclaimer: This note was dictated with voice recognition software. Though review and correction are routine, we apologize for any errors.

## (undated) DEVICE — LIGHT HANDLE: Brand: DEVON

## (undated) DEVICE — SOLUTION IRRIG 3000ML 0.9% SOD CHL USP UROMATIC PLAS CONT

## (undated) DEVICE — ZIMMER® STERILE DISPOSABLE TOURNIQUET CUFF WITH PLC, DUAL PORT, SINGLE BLADDER, 30 IN. (76 CM)

## (undated) DEVICE — 4.5 MM FULL RADIUS ELITE STRAIGHT                                    DISPOSABLE BLADES, MAROON,PACKAGED 6                                    PER BOX, STERILE

## (undated) DEVICE — STRIP,CLOSURE,WOUND,MEDI-STRIP,1/2X4: Brand: MEDLINE

## (undated) DEVICE — ZIMMER® STERILE DISPOSABLE TOURNIQUET CUFF WITH PLC, DUAL PORT, SINGLE BLADDER, 34 IN. (86 CM)

## (undated) DEVICE — Device

## (undated) DEVICE — SHEET,DRAPE,53X77,STERILE: Brand: MEDLINE

## (undated) DEVICE — APPLICATOR MEDICATED 26 CC SOLUTION HI LT ORNG CHLORAPREP

## (undated) DEVICE — DEVON TUBE HOLDER REMOVABLE TOUCH FASTEN STRAP: Brand: DEVON

## (undated) DEVICE — SUTURE MCRYL + SZ 4-0 L27IN ABSRB UD L19MM PS-2 3/8 CIR MCP426H

## (undated) DEVICE — GLOVE ORANGE PI 7 1/2   MSG9075

## (undated) DEVICE — BNDG,ELSTC,MATRIX,STRL,6"X5YD,LF,HOOK&LP: Brand: MEDLINE

## (undated) DEVICE — MASC TURNOVER KIT: Brand: MEDLINE INDUSTRIES, INC.

## (undated) DEVICE — TOWEL,OR,DSP,ST,BLUE,STD,4/PK,20PK/CS: Brand: MEDLINE

## (undated) DEVICE — GLOVE SURG SZ 75 L12IN FNGR THK79MIL GRN LTX FREE

## (undated) DEVICE — GOWN SIRUS NONREIN XL W/TWL: Brand: MEDLINE INDUSTRIES, INC.

## (undated) DEVICE — DRAPE,U/ SHT,SPLIT,PLAS,STERIL: Brand: MEDLINE

## (undated) DEVICE — DRESSING,GAUZE,XEROFORM,CURAD,1"X8",ST: Brand: CURAD

## (undated) DEVICE — 3M™ STERI-DRAPE™ ARTHROSCOPY SHEET WITH POUCH 1194: Brand: STERI-DRAPE™

## (undated) DEVICE — STERILE SURGICAL BLADE SIZE 15: Brand: CARDINAL HEALTH

## (undated) DEVICE — SPONGE LAP W18XL18IN WHT COT 4 PLY FLD STRUNG RADPQ DISP ST

## (undated) DEVICE — PENCIL ES L3M BTTN SWCH S STL HEX LOK BLDE ELECTRD HOLSTER